# Patient Record
Sex: FEMALE | Race: WHITE | NOT HISPANIC OR LATINO | Employment: OTHER | URBAN - METROPOLITAN AREA
[De-identification: names, ages, dates, MRNs, and addresses within clinical notes are randomized per-mention and may not be internally consistent; named-entity substitution may affect disease eponyms.]

---

## 2018-12-20 ENCOUNTER — OFFICE VISIT (OUTPATIENT)
Dept: URGENT CARE | Facility: CLINIC | Age: 78
End: 2018-12-20
Payer: MEDICARE

## 2018-12-20 ENCOUNTER — HOSPITAL ENCOUNTER (OUTPATIENT)
Facility: MEDICAL CENTER | Age: 78
End: 2018-12-20
Attending: NURSE PRACTITIONER
Payer: MEDICARE

## 2018-12-20 VITALS
DIASTOLIC BLOOD PRESSURE: 78 MMHG | HEART RATE: 63 BPM | OXYGEN SATURATION: 98 % | HEIGHT: 66 IN | BODY MASS INDEX: 25.07 KG/M2 | SYSTOLIC BLOOD PRESSURE: 122 MMHG | WEIGHT: 156 LBS | RESPIRATION RATE: 16 BRPM | TEMPERATURE: 97.8 F

## 2018-12-20 DIAGNOSIS — R30.0 DYSURIA: ICD-10-CM

## 2018-12-20 LAB
APPEARANCE UR: CLEAR
BILIRUB UR STRIP-MCNC: NORMAL MG/DL
COLOR UR AUTO: YELLOW
GLUCOSE UR STRIP.AUTO-MCNC: NORMAL MG/DL
KETONES UR STRIP.AUTO-MCNC: NORMAL MG/DL
LEUKOCYTE ESTERASE UR QL STRIP.AUTO: NORMAL
NITRITE UR QL STRIP.AUTO: NORMAL
PH UR STRIP.AUTO: 7 [PH] (ref 5–8)
PROT UR QL STRIP: NORMAL MG/DL
RBC UR QL AUTO: NORMAL
SP GR UR STRIP.AUTO: 1.01
UROBILINOGEN UR STRIP-MCNC: 0.2 MG/DL

## 2018-12-20 PROCEDURE — 87086 URINE CULTURE/COLONY COUNT: CPT

## 2018-12-20 PROCEDURE — 99204 OFFICE O/P NEW MOD 45 MIN: CPT | Performed by: NURSE PRACTITIONER

## 2018-12-20 PROCEDURE — 81002 URINALYSIS NONAUTO W/O SCOPE: CPT | Performed by: NURSE PRACTITIONER

## 2018-12-20 RX ORDER — CIPROFLOXACIN 500 MG/1
500 TABLET, FILM COATED ORAL EVERY 12 HOURS
Qty: 14 TAB | Refills: 0 | Status: SHIPPED | OUTPATIENT
Start: 2018-12-20 | End: 2018-12-27

## 2018-12-20 ASSESSMENT — ENCOUNTER SYMPTOMS
CHILLS: 0
FLANK PAIN: 0

## 2018-12-20 NOTE — PROGRESS NOTES
"Subjective:      Farzana Gonzáles is a 78 y.o. female who presents with UTI (frequently and burning when going to the bathroom)            Dysuria    This is a new problem. Episode onset: pt presents with recent onset of frequency and urgency with urination. She denies any fever or flank pain. Reports hx of recurrent UTIs and is under the care of a Urologist. The problem occurs every urination. The problem has been gradually worsening. The quality of the pain is described as burning. There has been no fever. Associated symptoms include frequency and urgency. Pertinent negatives include no chills, discharge or flank pain. She has tried increased fluids for the symptoms. The treatment provided no relief. Her past medical history is significant for recurrent UTIs.       Review of Systems   Constitutional: Negative for chills.   Genitourinary: Positive for dysuria, frequency and urgency. Negative for flank pain.   All other systems reviewed and are negative.    No past medical history on file. No past surgical history on file.   Social History     Social History   • Marital status: Single     Spouse name: N/A   • Number of children: N/A   • Years of education: N/A     Occupational History   • Not on file.     Social History Main Topics   • Smoking status: Never Smoker   • Smokeless tobacco: Never Used   • Alcohol use Not on file   • Drug use: Unknown   • Sexual activity: Not on file     Other Topics Concern   • Not on file     Social History Narrative   • No narrative on file          Objective:     /78   Pulse 63   Temp 36.6 °C (97.8 °F)   Resp 16   Ht 1.676 m (5' 6\")   Wt 70.8 kg (156 lb)   SpO2 98%   BMI 25.18 kg/m²      Physical Exam   Constitutional: She is oriented to person, place, and time. Vital signs are normal. She appears well-developed and well-nourished.   HENT:   Head: Normocephalic and atraumatic.   Eyes: Pupils are equal, round, and reactive to light. EOM are normal.   Neck: Normal range of " motion.   Cardiovascular: Normal rate and regular rhythm.    Pulmonary/Chest: Effort normal.   Abdominal: Soft. Normal appearance. There is tenderness in the suprapubic area. There is no CVA tenderness.   Musculoskeletal: Normal range of motion.   Neurological: She is alert and oriented to person, place, and time.   Skin: Skin is warm and dry. Capillary refill takes less than 2 seconds.   Psychiatric: She has a normal mood and affect. Her speech is normal and behavior is normal. Thought content normal.   Vitals reviewed.         Lab Results   Component Value Date/Time    POCCOLOR Yellow 12/20/2018 01:01 PM    POCAPPEAR Clear 12/20/2018 01:01 PM    POCLEUKEST Neg 12/20/2018 01:01 PM    POCNITRITE Neg 12/20/2018 01:01 PM    POCUROBILIGE 0.2 12/20/2018 01:01 PM    POCPROTEIN Neg 12/20/2018 01:01 PM    POCURPH 7.0 12/20/2018 01:01 PM    POCBLOOD Neg 12/20/2018 01:01 PM    POCSPGRV 1.015 12/20/2018 01:01 PM    POCKETONES Neg 12/20/2018 01:01 PM    POCBILIRUBIN Neg 12/20/2018 01:01 PM    POCGLUCUA Neg 12/20/2018 01:01 PM           Assessment/Plan:     1. Dysuria  - POCT Urinalysis  - Urine Culture; Future  - ciprofloxacin (CIPRO) 500 MG Tab; Take 1 Tab by mouth every 12 hours for 7 days.  Dispense: 14 Tab; Refill: 0    Discussed with patient due to frequency of recurrent UTIs will cx urine even though her UA is unremarkable.   Her Urologist is suggesting she be put on Cipro for the time being and I am amenable to this plan  Tylenol and ibuprofen as needed for pain  Increase water intake  Will call with cx results when available, approves detailed   Supportive care, differential diagnoses, and indications for immediate follow-up discussed with patient.    Pathogenesis of diagnosis discussed including typical length and natural progression.      Instructed to return to  or nearest emergency department if symptoms fail to improve, for any change in condition, further concerns, or new concerning symptoms.  Patient states  understanding of the plan of care and discharge instructions.

## 2018-12-22 LAB
BACTERIA UR CULT: NORMAL
SIGNIFICANT IND 70042: NORMAL
SITE SITE: NORMAL
SOURCE SOURCE: NORMAL